# Patient Record
Sex: FEMALE | Race: WHITE | URBAN - METROPOLITAN AREA
[De-identification: names, ages, dates, MRNs, and addresses within clinical notes are randomized per-mention and may not be internally consistent; named-entity substitution may affect disease eponyms.]

---

## 2023-02-20 ENCOUNTER — APPOINTMENT (OUTPATIENT)
Dept: GENERAL RADIOLOGY | Age: 78
End: 2023-02-20
Payer: MEDICARE

## 2023-02-20 ENCOUNTER — HOSPITAL ENCOUNTER (EMERGENCY)
Age: 78
Discharge: HOME | End: 2023-02-20
Attending: EMERGENCY MEDICINE
Payer: MEDICARE

## 2023-02-20 VITALS
OXYGEN SATURATION: 97 % | TEMPERATURE: 97.7 F | DIASTOLIC BLOOD PRESSURE: 65 MMHG | SYSTOLIC BLOOD PRESSURE: 166 MMHG | RESPIRATION RATE: 16 BRPM | BODY MASS INDEX: 21.26 KG/M2 | HEART RATE: 92 BPM | WEIGHT: 120 LBS | HEIGHT: 63 IN

## 2023-02-20 DIAGNOSIS — S93.601A SPRAIN OF RIGHT FOOT, INITIAL ENCOUNTER: ICD-10-CM

## 2023-02-20 DIAGNOSIS — S92.901A FOOT FRACTURE, RIGHT, CLOSED, INITIAL ENCOUNTER: Primary | ICD-10-CM

## 2023-02-20 PROCEDURE — 99283 EMERGENCY DEPT VISIT LOW MDM: CPT

## 2023-02-20 PROCEDURE — 73630 X-RAY EXAM OF FOOT: CPT

## 2023-02-20 ASSESSMENT — PAIN DESCRIPTION - ORIENTATION: ORIENTATION: RIGHT

## 2023-02-20 ASSESSMENT — PAIN SCALES - GENERAL: PAINLEVEL_OUTOF10: 9

## 2023-02-20 ASSESSMENT — PAIN - FUNCTIONAL ASSESSMENT: PAIN_FUNCTIONAL_ASSESSMENT: 0-10

## 2023-02-20 ASSESSMENT — PAIN DESCRIPTION - LOCATION: LOCATION: FOOT

## 2023-02-20 NOTE — DISCHARGE INSTRUCTIONS
It is recommended that you keep your boot on when ambulatory. Take Aleve (naproxen) for your pain. Tylenol as needed. Otherwise, utilize rest, ice for 20 minutes several times a day, and elevate as much as possible to minimize the pain and swelling. Follow-up with Ortho if no improvement in 3-4 days. PLEASE RETURN TO THE EMERGENCY DEPARTMENT IMMEDIATELY if your symptoms worsen in anyway or in 1-2 days if not improved for re-evaluation. You should immediately return to the ER for symptoms such as new or worsening pain, fever, numbness or weakness to the arms or legs, coolness or color change of the arms or legs. Val 71!!!    From [address] and [facility] Emergency Services    On behalf of the Emergency Department staff at [address], I would like to thank you for giving us the opportunity to address your health care needs and concerns. We hope that during your visit, our service was delivered in a professional and caring manner. Please keep [address] in mind as we walk with you down the path to your own personal wellness. Please expect an automated text message or email from us so we can ask a few questions about your health and progress. Based on your answers, a clinician may call you back to offer help and instructions. Please understand that early in the process of an illness or injury, an emergency department workup can be falsely reassuring. If you notice any worsening, changing or persistent symptoms please call your family doctor or return to the ER immediately. Tell us how we did during your visit at [email or web address]   and let us know about your experience

## 2023-02-20 NOTE — ED PROVIDER NOTES
[address] [facility] Emergency Department  [address] [address] [facility] [facility] [address]  Phone: [phone]  Fax: [phone]        Pt Name: James Garcia  MRN: [id number]  [place] 1945  Date of evaluation: 23    [address]       Chief Complaint   Patient presents with    Foot Pain       HISTORY OF PRESENT ILLNESS (Location/Symptom, Timing/Onset, Context/Setting, Quality, Duration, Modifying Factors, Severity)      James Garcia is a 69 y.o. female with no pertinent PMH who presents to the ED via private auto with right foot pain. Patient states that for the past 2 days she has been experiencing pain to the dorsal lateral aspect of her right foot extending into the plantar surface of the foot. She states that she has been walking on her heels to help with the pain but that it is worse when she bears complete weight on it. Denies any known trauma or injury but does report that she walks on treadmill daily. She took some Tylenol for the pain but this is not helping. She reports that when she is elevated does help but often times middle the night she wakes up and it is throbbing. Denies any fever, chills, numbness, weakness, paresthesias, rash, abrasions, lacerations, bleeding disorders or use of anticoagulation, pain to the surrounding joints, any other injuries, or any other concerns at this time. PAST MEDICAL / SURGICAL / SOCIAL / FAMILY HISTORY     PMH:  has a past medical history of Hypertension. Surgical History:  has a past surgical history that includes  section. Social History:  reports that she has never smoked. She does not have any smokeless tobacco history on file. She reports current alcohol use. She reports that she does not use drugs. Family History: has no family status information on file. family history is not on file. Psychiatric History: None    Allergies: Patient has no known allergies.     Home Medications:   Prior to Admission medications   Medication Sig Start Date End Date Taking? Authorizing Provider   HYDROcodone-acetaminophen (NORCO) 5-325 MG per tablet Take 1-2 tablets by mouth every 6 hours as needed for Pain.  Patient not taking: Reported on 2/20/2023 2/16/15   Amanuel Caldwell MD   amLODIPine (NORVASC) 5 MG tablet Take 5 mg by mouth daily.  Patient not taking: Reported on 2/20/2023    Historical Provider, MD   lisinopril (PRINIVIL;ZESTRIL) 20 MG tablet Take 20 mg by mouth daily.    Historical Provider, MD   Multiple Vitamins-Minerals (THERAPEUTIC MULTIVITAMIN-MINERALS) tablet Take 1 tablet by mouth daily.    Historical Provider, MD   aspirin 81 MG tablet Take 81 mg by mouth daily.  Patient not taking: Reported on 2/20/2023    Historical Provider, MD   Calcium Carbonate-Vitamin D (CALCIUM-D PO) Take  by mouth.    Historical Provider, MD       REVIEW OF SYSTEMS  (2-9 systems for level 4, 10 ormore for level 5)      Review of Systems    See HPI.    PHYSICAL EXAM  (up to 7 for level 4, 8 or more for level 5)      INITIAL VITALS:  height is 5' 3\" (1.6 m) and weight is 54.4 kg (120 lb). Her oral temperature is 97.7 °F (36.5 °C). Her blood pressure is 166/65 (abnormal) and her pulse is 92. Her respiration is 16 and oxygen saturation is 97%.      Vital signs reviewed.    Physical Exam    General:  Alert, cooperative, well-groomed, well-nourished, appears stated age, and is in no acute distress.   Head:  Normocephalic, atraumatic, and without obvious abnormality.   Eyes:  Sclerae/conjunctivae clear without injection, pallor, or icterus. Corneas clear without opacities. EOM's intact.   ENT: Ears and nose are all without obvious masses lesion or deformity. No oropharynx examination performed due to aerosolization risk during COVID-19 pandemic.   Neck: Supple and symmetrical. Trachea midline. No adenopathy.   Musculoskeletal: The right foot appears mildly swollen with TTP over the lateral metacarpals more dorsal than plantar. No proximal tibial tenderness.  No pinpoint proximal 5th metatarsal tenderness. No deformities, ecchymosis, erythema, warmth, abrasions, or lacerations to the area. Good ROM. Ankle strength 5/5. Sensation intact to light touch. The surrounding knee and toe joints are normal in appearance with full ROM and 5/5 strength. DP pulses 2+ and symmetrical. Cap refill <2 seconds. Extremities: Warm and dry without erythema or edema. No venous stasis changes. Skin: Soft, good turgor, and well-hydrated. No obvious rashes or lesions. Neurologic: GCS is 15 and no focal deficits are appreciated. Normal gait. Grossly normal motor and sensation. Speech clear. Psychiatric: Normal mood and affect. Normal behavior. Coherent thought process. DIFFERENTIAL DIAGNOSIS / MDM     The patient presented to the ED with foot pain with a mechanism concerning for fracture vs sprain. Vital signs stable. The knee joint was not affected and the ankle is stable on exam. There are no lesions, lacerations, or signs of compartment syndrome. Pulses are 2+ and sensation is intact. Motor is 5/5. Will obtain an XR and give ice and ibuprofen for pain. PLAN (LABS / IMAGING / EKG):  Orders Placed This Encounter   Procedures    XR FOOT RIGHT (MIN 3 VIEWS)       MEDICATIONS ORDERED:  No orders of the defined types were placed in this encounter. Controlled Substances Monitoring:     DIAGNOSTIC RESULTS     RADIOLOGY: All images are read by the radiologist and their interpretations are reviewed. XR FOOT RIGHT (MIN 3 VIEWS)    Result Date: 2/20/2023  EXAMINATION: THREE XRAY VIEWS OF THE RIGHT FOOT 2/20/2023 11:19 am COMPARISON: None. HISTORY: ORDERING SYSTEM PROVIDED HISTORY: lateral dorsal foot pain TECHNOLOGIST PROVIDED HISTORY: lateral dorsal foot pain Reason for Exam: lateral dorsal foot pain, no injury, no surgery, student [employer] 67-year-old female with lateral dorsal foot pain FINDINGS: Severe degenerative changes at the 1st MTP/MTS joints.   Mild hallux valgus/metatarsus varus.  No marginal erosions.  Mild degenerative changes of the midfoot and TMT joints.  Moderate plantar calcaneal spur. Atherosclerotic calcification of the vasculature.  No tibiotalar joint effusion. Age-indeterminate avulsion fractures at the dorsal midfoot.  Many visualized osseous structures appear intact.     1. Age-indeterminate avulsion fractures at the dorsal midfoot.  If there is point tenderness in this region, consider further evaluation with CT. 2. Severe degenerative changes of the 1st MTP/MTS joints with mild hallux valgus/metatarsus varus. 3. Mild degenerative changes as detailed above.  Moderate plantar calcaneal spur.       LABS:  No results found for this visit on 02/20/23.    EMERGENCY DEPARTMENT COURSE           Vitals:    Vitals:    02/20/23 1100   BP: (!) 166/65   Pulse: 92   Resp: 16   Temp: 97.7 °F (36.5 °C)   TempSrc: Oral   SpO2: 97%   Weight: 54.4 kg (120 lb)   Height: 5' 3\" (1.6 m)     -------------------------  BP: (!) 166/65, Temp: 97.7 °F (36.5 °C), Heart Rate: 92, Resp: 16      RE-EVALUATION:  Patient's XR does demonstrate some possible avulsion fractures to the dorsal midfoot and the age is indeterminate.  Patient updated regarding these results.  I repalpated her foot and she does have some tenderness to this region more on the lateral aspect of the dorsal foot.  Elected to treat this as an acute avulsion type fracture although there was no injury.  It is certainly possible that it is just a sprain but feel that we should be safe.  She has a boot at home and would like to wear this.  Discussed supportive care instructions for home and she will follow-up with her orthopedic specialist.  I did give her hours for follow-up as well as needed.    The patient and/or family and I have discussed the diagnosis and risks, and we agree with discharging home to follow-up with their PCP and/or pertinent providers. The patient appears stable for discharge and has been  instructed to return immediately for new concerning symptoms like fever, increased pain, weakness, numbness, color change, or coldness to an extremity or if the current symptoms worsen in any way. The patient understands that at this time there is no evidence for a more malignant underlying process, but the patient also understands that early in the process of an illness or injury, an emergency department workup can be falsely reassuring. Routine discharge counseling was given, and the patient understands that worsening, changing or persistent symptoms should prompt an immediate call or follow up with their primary physician or return to the emergency department. I have reviewed the disposition diagnosis with the patient and or their family/guardian. I have answered their questions and given discharge instructions. They voiced understanding of these instructions and did not have any further questions or complaints. The collaborating physician was available for consultation and they were apprised of the assessment and plan. CONSULTS:  None    PROCEDURES:  None    FINAL IMPRESSION      1. Foot fracture, right, closed, initial encounter    2. Sprain of right foot, initial encounter          DISPOSITION / PLAN     CONDITION ON DISPOSITION:   Good / Stable for discharge.      PATIENT REFERRED TO:  [facility]  [address] [address] [address] [place] [address]  [phone]  Call in 3 days  If no improvement or symptoms worsen for recheck and follow-up    DISCHARGE MEDICATIONS:  Discharge Medication List as of 2/20/2023 12:48 PM          [facility]   Emergency Medicine Physician Assistant    (Please note that portions of this note were completed with a voice recognition program.  Efforts were made to edit the dictations but occasionally words aremis-transcribed.)       London Marroquin PA-C  02/20/23 0047

## 2023-02-20 NOTE — ED PROVIDER NOTES
I was present in the ED during this patient's evaluation and management by the Advance Practice Provider and was available to address any concerns about their medical management.     Latoya Foster MD  Attending, Emergency Department       Sun Christian MD  02/20/23 9632